# Patient Record
Sex: FEMALE | Race: WHITE | NOT HISPANIC OR LATINO | Employment: UNEMPLOYED | URBAN - METROPOLITAN AREA
[De-identification: names, ages, dates, MRNs, and addresses within clinical notes are randomized per-mention and may not be internally consistent; named-entity substitution may affect disease eponyms.]

---

## 2024-03-22 ENCOUNTER — OFFICE VISIT (OUTPATIENT)
Dept: URGENT CARE | Facility: CLINIC | Age: 39
End: 2024-03-22
Payer: MEDICAID

## 2024-03-22 VITALS
BODY MASS INDEX: 26.19 KG/M2 | OXYGEN SATURATION: 96 % | HEIGHT: 67 IN | DIASTOLIC BLOOD PRESSURE: 62 MMHG | WEIGHT: 166.9 LBS | HEART RATE: 81 BPM | SYSTOLIC BLOOD PRESSURE: 96 MMHG | TEMPERATURE: 97.3 F | RESPIRATION RATE: 16 BRPM

## 2024-03-22 DIAGNOSIS — R11.2 NAUSEA AND VOMITING, UNSPECIFIED VOMITING TYPE: ICD-10-CM

## 2024-03-22 DIAGNOSIS — E86.0 DEHYDRATION: ICD-10-CM

## 2024-03-22 LAB
APPEARANCE UR: NORMAL
BILIRUB UR STRIP-MCNC: NORMAL MG/DL
COLOR UR AUTO: NORMAL
GLUCOSE UR STRIP.AUTO-MCNC: NEGATIVE MG/DL
KETONES UR STRIP.AUTO-MCNC: 15 MG/DL
LEUKOCYTE ESTERASE UR QL STRIP.AUTO: NEGATIVE
NITRITE UR QL STRIP.AUTO: NEGATIVE
PH UR STRIP.AUTO: 6 [PH] (ref 5–8)
POCT INT CON NEG: NEGATIVE
POCT INT CON POS: POSITIVE
POCT URINE PREGNANCY TEST: NEGATIVE
PROT UR QL STRIP: NEGATIVE MG/DL
RBC UR QL AUTO: NEGATIVE
SP GR UR STRIP.AUTO: >=1.03
UROBILINOGEN UR STRIP-MCNC: 1 MG/DL

## 2024-03-22 PROCEDURE — 3078F DIAST BP <80 MM HG: CPT

## 2024-03-22 PROCEDURE — 81002 URINALYSIS NONAUTO W/O SCOPE: CPT

## 2024-03-22 PROCEDURE — 81025 URINE PREGNANCY TEST: CPT

## 2024-03-22 PROCEDURE — 3074F SYST BP LT 130 MM HG: CPT

## 2024-03-22 PROCEDURE — 99204 OFFICE O/P NEW MOD 45 MIN: CPT | Mod: 25

## 2024-03-22 RX ORDER — PROMETHAZINE HYDROCHLORIDE 25 MG/1
25 TABLET ORAL EVERY 6 HOURS PRN
Qty: 5 TABLET | Refills: 0 | Status: SHIPPED | OUTPATIENT
Start: 2024-03-22

## 2024-03-22 RX ORDER — LAMOTRIGINE 25 MG/1
25 TABLET ORAL DAILY
COMMUNITY
Start: 2024-01-31

## 2024-03-22 RX ORDER — BUPRENORPHINE HYDROCHLORIDE AND NALOXONE HYDROCHLORIDE DIHYDRATE 8; 2 MG/1; MG/1
1 TABLET SUBLINGUAL DAILY
COMMUNITY

## 2024-03-22 RX ORDER — LURASIDONE HYDROCHLORIDE 40 MG/1
40 TABLET, FILM COATED ORAL
COMMUNITY
Start: 2024-01-30

## 2024-03-22 RX ORDER — GABAPENTIN 100 MG/1
100 CAPSULE ORAL
COMMUNITY
Start: 2024-01-30

## 2024-03-22 RX ORDER — TRAZODONE HYDROCHLORIDE 50 MG/1
50 TABLET ORAL
COMMUNITY
Start: 2024-01-30

## 2024-03-22 RX ORDER — ONDANSETRON 4 MG/1
4 TABLET, ORALLY DISINTEGRATING ORAL ONCE
Status: COMPLETED | OUTPATIENT
Start: 2024-03-22 | End: 2024-03-22

## 2024-03-22 RX ADMIN — ONDANSETRON 4 MG: 4 TABLET, ORALLY DISINTEGRATING ORAL at 12:37

## 2024-03-22 NOTE — PROGRESS NOTES
"Chief Complaint   Patient presents with    Emesis     X 2 days vomiting/ [facility] wants her to come here it started after starting the new meds          Subjective:   HISTORY OF PRESENT ILLNESS: Lina Mojica is a 38 y.o. female who presents for nausea and vomiting x 2 days after starting subutex [facility].  She had a similar reaction to naltrexone in the beginning of the year.  Her last use of meth she reports was in mid February, no narcotics or drug use since.     Patient denies abd pain, diarrhea, fevers, runny nose or cough. She  tried zofran the last time this occurred and reports it was unhelpful for her nausea     Medications, Allergies, current problem list, Social and Family history reviewed today in Epic.     Objective:     BP 96/62 (BP Location: Left arm, Patient Position: Sitting, BP Cuff Size: Adult)   Pulse 81   Temp 36.3 °C (97.3 °F) (Temporal)   Resp 16   Ht 1.702 m (5' 7\")   Wt 75.7 kg (166 lb 14.4 oz)   SpO2 96%     Physical Exam  Vitals reviewed.   Constitutional:       General: She is not in acute distress.     Appearance: Normal appearance. She is not ill-appearing.   HENT:      Head: Normocephalic.      Nose: No congestion or rhinorrhea.      Mouth/Throat:      Mouth: Mucous membranes are moist.   Eyes:      Pupils: Pupils are equal, round, and reactive to light.   Cardiovascular:      Rate and Rhythm: Normal rate.      Pulses: Normal pulses.   Pulmonary:      Effort: Pulmonary effort is normal.   Abdominal:      General: Abdomen is flat. Bowel sounds are normal.      Palpations: Abdomen is soft.   Musculoskeletal:      Cervical back: Normal range of motion.   Skin:     General: Skin is warm and dry.      Capillary Refill: Capillary refill takes less than 2 seconds.   Neurological:      General: No focal deficit present.      Mental Status: She is alert and oriented to person, place, and time.   Psychiatric:         Mood and Affect: Mood normal.      "     Assessment/Plan:     Diagnosis and associated orders    I personally reviewed prior external notes and test results pertinent to today's visit.     1. Nausea and vomiting, unspecified vomiting type  POCT Urinalysis    POCT Pregnancy    promethazine (PHENERGAN) 25 MG Tab    ondansetron (Zofran ODT) dispertab 4 mg      2. Dehydration          Results for orders placed or performed in visit on 03/22/24   POCT Urinalysis   Result Value Ref Range    POC Color DARK YELLOW Negative    POC Appearance CLOUDY Negative    POC Glucose NEGATIVE Negative mg/dL    POC Bilirubin SMALL Negative mg/dL    POC Ketones 15 Negative mg/dL    POC Specific Gravity >=1.030 <1.005 - >1.030    POC Blood NEGATIVE Negative    POC Urine PH 6.0 5.0 - 8.0    POC Protein NEGATIVE Negative mg/dL    POC Urobiligen 1.0 Negative (0.2) mg/dL    POC Nitrites NEGATIVE Negative    POC Leukocyte Esterase NEGATIVE Negative   POCT Pregnancy   Result Value Ref Range    POC Urine Pregnancy Test Negative     Internal Control Positive Positive     Internal Control Negative Negative         IMPRESSION:  Pt has stable vital signs and no red flag symptoms or exam findings identified.  Her bad is soft and non-tender, she has dry mucus membranes, her BP is on the lower side but she has no tachycardia or dizziness when she stands, she is making urine, no clinic signs of severe dehydration.  I think she can be managed as an outpt, although I advised her if the phenergan is not working she will need to go to the ER for IV fluids.  She may not be able to get the phen[facility]gan until [facility] so she is given a dose of zofran in the clinic  Advised to push oral fluids with electrolytes    Differential diagnosis discussed. Pt was Educated on red flag symptoms. Pt has been Instructed to return to Urgent Care or nearest Emergency Department if symptoms fail to improve, for any change in condition, further concerns, or new concerning symptoms. Patient states  understanding of the plan of care and discharge instructions.  They are discharged in stable condition.         Please note that this dictation was created using voice recognition software. I have made a reasonable attempt to correct obvious errors, but I expect that there are errors of grammar and possibly content that I did not discover before finalizing the note.    This note was electronically signed by SREEKANTH Colvin

## 2024-04-02 ENCOUNTER — HOSPITAL ENCOUNTER (EMERGENCY)
Facility: MEDICAL CENTER | Age: 39
End: 2024-04-03
Attending: EMERGENCY MEDICINE
Payer: MEDICAID

## 2024-04-02 DIAGNOSIS — R45.851 SUICIDAL IDEATION: ICD-10-CM

## 2024-04-02 DIAGNOSIS — R44.3 HALLUCINATIONS: ICD-10-CM

## 2024-04-02 LAB
AMPHET UR QL SCN: NEGATIVE
BARBITURATES UR QL SCN: NEGATIVE
BENZODIAZ UR QL SCN: NEGATIVE
BZE UR QL SCN: NEGATIVE
CANNABINOIDS UR QL SCN: NEGATIVE
FENTANYL UR QL: NEGATIVE
FLUAV RNA SPEC QL NAA+PROBE: NEGATIVE
FLUBV RNA SPEC QL NAA+PROBE: NEGATIVE
HCG UR QL: NEGATIVE
METHADONE UR QL SCN: NEGATIVE
OPIATES UR QL SCN: NEGATIVE
OXYCODONE UR QL SCN: NEGATIVE
PCP UR QL SCN: NEGATIVE
POC BREATHALIZER: 0 PERCENT (ref 0–0.01)
PROPOXYPH UR QL SCN: NEGATIVE
RSV RNA SPEC QL NAA+PROBE: NEGATIVE
SARS-COV-2 RNA RESP QL NAA+PROBE: NOTDETECTED

## 2024-04-02 PROCEDURE — 0241U HCHG SARS-COV-2 COVID-19 NFCT DS RESP RNA 4 TRGT ED POC: CPT

## 2024-04-02 PROCEDURE — 99285 EMERGENCY DEPT VISIT HI MDM: CPT

## 2024-04-02 PROCEDURE — A9270 NON-COVERED ITEM OR SERVICE: HCPCS | Mod: UD | Performed by: EMERGENCY MEDICINE

## 2024-04-02 PROCEDURE — 80307 DRUG TEST PRSMV CHEM ANLYZR: CPT

## 2024-04-02 PROCEDURE — 700102 HCHG RX REV CODE 250 W/ 637 OVERRIDE(OP): Mod: UD | Performed by: EMERGENCY MEDICINE

## 2024-04-02 PROCEDURE — 90791 PSYCH DIAGNOSTIC EVALUATION: CPT

## 2024-04-02 PROCEDURE — 81025 URINE PREGNANCY TEST: CPT

## 2024-04-02 PROCEDURE — 302970 POC BREATHALIZER: Performed by: EMERGENCY MEDICINE

## 2024-04-02 RX ORDER — HALOPERIDOL 5 MG/1
5 TABLET ORAL ONCE
Status: COMPLETED | OUTPATIENT
Start: 2024-04-02 | End: 2024-04-02

## 2024-04-02 RX ADMIN — HALOPERIDOL 5 MG: 5 TABLET ORAL at 17:45

## 2024-04-03 VITALS
TEMPERATURE: 98.4 F | HEIGHT: 67 IN | RESPIRATION RATE: 17 BRPM | BODY MASS INDEX: 26.68 KG/M2 | DIASTOLIC BLOOD PRESSURE: 89 MMHG | SYSTOLIC BLOOD PRESSURE: 120 MMHG | OXYGEN SATURATION: 98 % | HEART RATE: 74 BPM | WEIGHT: 170 LBS

## 2024-04-03 NOTE — ED PROVIDER NOTES
"Patient:Lina Mojica  Patient : 1985  Patient MRN: [id number]  Patient PCP: Pcp Pt States None    Admit Date: 2024  Discharge Date and Time: 24 0100 AM  Discharge Diagnosis: Suicidal ideation  Discharge Attending: Connor Kwan D.O.  Discharge Service: ED Observation    ED Course  Lina is a 38 y.o. female who was signed out from Dr. Collado for continued monitoring until appropriate disposition can be arranged.  Please see his note for the initial evaluation and management.  Patient was accepted at [facility] unit and transferred for continued care.  No significant events while in the ED.    Discharge Exam:  /89   Pulse 74   Temp 36.9 °C (98.4 °F)   Resp 17   Ht 1.702 m (5' 7\")   Wt 77.1 kg (170 lb)   SpO2 98%   BMI 26.63 kg/m² .    Constitutional: Awake and alert. Nontoxic  HENT:  Grossly normal  Eyes: Grossly normal  Cardiovascular: Normal heart rate   Thorax & Lungs: No respiratory distress  Skin:  No pathologic rash.   Extremities: Well perfused    Labs  Results for orders placed or performed during the hospital encounter of 24   Urine Drug Screen   Result Value Ref Range    Amphetamines Urine Negative Negative    Barbiturates Negative Negative    Benzodiazepines Negative Negative    Cocaine Metabolite Negative Negative    Fentanyl, Urine Negative Negative    Methadone Negative Negative    Opiates Negative Negative    Oxycodone Negative Negative    Phencyclidine -Pcp Negative Negative    Propoxyphene Negative Negative    Cannabinoid Metab Negative Negative   BETA-HCG QUALITATIVE URINE   Result Value Ref Range    Beta-Hcg Urine Negative Negative   POC BREATHALIZER   Result Value Ref Range    POC Breathalizer 0.0 0.00 - 0.01 Percent   POC CoV-2, FLU A/B, RSV by PCR   Result Value Ref Range    POC Influenza A RNA, PCR Negative Negative    POC Influenza B RNA, PCR Negative Negative    POC RSV, by PCR Negative Negative    POC SARS-CoV-2, PCR NotDetected  "       Radiology  No orders to display       Disposition: Transfer to [facility] for inpatient psychiatric care    Follow up: No follow-ups on file.    Medications:   Discharge Medication List as of 4/3/2024  1:13 AM          Discharge Condition: Stable    Electronically signed by: Connor Kwan D.O., 4/3/2024 2:29 AM

## 2024-04-03 NOTE — DISCHARGE PLANNING
Alert Team:     Referral: Adult Patient Transfer to Mental Health Facility     Intervention: Received call from Paulina at [facility] stating that Dr. Moreau has accepted the patient for admission.  Facility requests that transport be arranged for 0100.     Arranged for transportation to be set up through [facility] with [facility] for [facility] transport.     The pt will be picked up at 0100.      Notified the RN of the departure time as well as accepting facility.      Created transfer packet and placed on chart.      Plan: Pt will transfer to [facility] at 0100.
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
## 2024-04-03 NOTE — ED TRIAGE NOTES
Chief Complaint   Patient presents with    Suicidal Ideation    Hallucinations       Pt BIB EMS for above complaint. Pt reports hx of schizoaffective and manic bipolar, currently medication complaint. Pt states yesterday she started having auditory hallucinations that have increased today. Pt reports hallucinations are of negative male figures from her life and are telling her to kill herself. Pt reports plan to cut her wrists and states she does have access to knives. Pt denies HI.     Legal hold initiated. 1:1 sitter at bedside. Pt changed into paper scrubs and all belongings removed from room. Urine sample collected and sent to lab. POC breathalyzer 0.0

## 2024-04-03 NOTE — ED PROVIDER NOTES
"ER Provider Note    Scribed for Darrick Collado D.O. by Missael Stanton. 4/2/2024  5:19 PM    Primary Care Provider: Pcp Pt States None    CHIEF COMPLAINT  Chief Complaint   Patient presents with    Suicidal Ideation    Hallucinations       HPI/ROS    Lina Mojica is a 38 y.o. female with a history of manic depression who presents to the Emergency Department via EMS for evaluation of suicidal ideation and hallucinations onset earlier today. The patient reports she is currently having a manic episode for the first time in a while. Yesterday she began to feel \"off\" and today she began experiencing auditory hallucinations that have been getting louder throughout the day. She reports these hallucinations are voices that are telling her to harm herself. She reports she has not tried to harm herself today but is unsure of if she would if she was not here in the ED. She notes she takes Latuda. The patient is currently trying to recover from drug and alcohol abuse and notes her last use of methamphetamine or heroine was 1 month ago and her last drink was in 2022.     ROS as per HPI.    PAST MEDICAL HISTORY  None noted.    SURGICAL HISTORY  None noted.     FAMILY HISTORY  None noted.     SOCIAL HISTORY   reports that she has quit smoking. Her smoking use included cigarettes. She has quit using smokeless tobacco. She reports that she does not currently use alcohol. She reports that she does not currently use drugs after having used the following drugs: Methamphetamines.    CURRENT MEDICATIONS  Previous Medications    BUPRENORPHINE-NALOXONE (SUBOXONE) 8-2 MG SL TAB SL    Place 1 Tablet under the tongue every day.    GABAPENTIN (NEURONTIN) 100 MG CAP    Take 100 mg by mouth.    LAMOTRIGINE (LAMICTAL) 25 MG TAB    Take 25 mg by mouth every day.    LURASIDONE (LATUDA) 40 MG TAB    Take 40 mg by mouth.    PROMETHAZINE (PHENERGAN) 25 MG TAB    Take 1 Tablet by mouth every 6 hours as needed for Nausea/Vomiting for up to 5 " "doses.    TRAZODONE (DESYREL) 50 MG TAB    Take 50 mg by mouth.       ALLERGIES  Patient has no known allergies.    PHYSICAL EXAM  /71   Pulse 85   Temp 36.2 °C (97.1 °F) (Temporal)   Resp 16   Ht 1.702 m (5' 7\")   Wt 77.1 kg (170 lb)   SpO2 96%   BMI 26.63 kg/m²     General: No acute distress.  HENT: Normocephalic, Mucus membranes are moist.   Chest: Lungs have even and unlabored respirations, Clear to auscultation.   Cardiovascular: Regular rate and regular rhythm, No peripheral cyanosis.  Abdomen: Non distended.  Neuro: Awake, Conversive, Able to relay recent events.  Psychiatric: Mildly anxious, admits to hearing voices telling her to harm herself and states she does not know if she will be safe at home    EXTERNAL RECORDS REVIEWED  Review of patient's past medical records from [place] show no encounters for psychosis or depression. She was seen in office on 1/21/22 with a diagnosis of methamphetamine use.     INITIAL ASSESSMENT  The patient has a history of manic depression and feels this is an escalation of that. She reports voices telling her to harm herself. She denies any drug or alcohol use. Patient will be treated with haldol and evaluation with psychology social work.    ED Observation Status? Yes; I am placing the patient in to an observation status due to a diagnostic uncertainty as well as therapeutic intensity. Patient placed in observation status at 5:19 PM, 4/2/2024.     Observation plan is as follows: Will hold in ED until safe disposition.      DIAGNOSTIC STUDIES    Labs:   Results for orders placed or performed during the hospital encounter of 04/02/24   Urine Drug Screen   Result Value Ref Range    Amphetamines Urine Negative Negative    Barbiturates Negative Negative    Benzodiazepines Negative Negative    Cocaine Metabolite Negative Negative    Fentanyl, Urine Negative Negative    Methadone Negative Negative    Opiates Negative Negative    Oxycodone Negative Negative    " Phencyclidine -Pcp Negative Negative    Propoxyphene Negative Negative    Cannabinoid Metab Negative Negative   POC BREATHALIZER   Result Value Ref Range    POC Breathalizer 0.0 0.00 - 0.01 Percent       COURSE & MEDICAL DECISION MAKING     COURSE AND PLAN  5:19 PM - Patient seen and examined at bedside. Discussed plan of care, including treating the patient's symptoms and discussed with social work. Patient agrees to the plan of care. The patient will be medicated with Haldol 5 mg PO. Ordered for urine drug screen and POC breathalizer to evaluate her symptoms.     5:51 PM - The patient was seen by the alert team, and they recommend a legal hold. Legal hold was initiated at this time. I will maintain observation.     ED Summary: This patient has a history of bipolar and she is feeling manic, she is hearing voices that are telling her to harm herself.  She has not acted out on these voices but does not know if she will be safe for discharge.  She was evaluate by the alert team and legal 2000 is initiated.  The patient is on a legal hold, and will be held in ED observation until appropriate psychiatric disposition can be made.      ADDITIONAL PROBLEM LIST      DISPOSITION AND DISCUSSIONS    Discussion of management with other QHP or appropriate source(s): Behavioral health evaluated the patient and recommended a legal hold.     Barriers to care at this time, including but not limited to: The patient does not have an established PCP.     ED observation    FINAL DIAGNOSIS  1. Hallucinations    2. Suicidal ideation        Missael MISTRY (Erendira), am scribing for, and in the presence of, Darrick Collado D.O..    Electronically signed by: Missael Stanton (Erendira), 4/2/2024    Darrick MISTRY D.O. personally performed the services described in this documentation, as scribed by Missael Stanton in my presence, and it is both accurate and complete.     The note accurately reflects work and decisions made by me.  Darrick  GILMA Collado  4/2/2024  7:13 PM

## 2024-04-03 NOTE — DISCHARGE PLANNING
[facility] ED Behavioral Health Fax Referral      [facility] ED Behavioral Health Alert Team:  [phone]    Referral: Legal Hold    Intervention: Patient referral to [facility] inpatient  facilty    Legal Hold Initiated: Date: 4/2/24 Time: 1712    Patient’s Insurance Listed on Face Sheet: Medicaid FFS    Referrals sent to: [facility]    Referrals faxed by Leny PÉREZ    This referral contains the following information:  Face sheet __x__  Page 1 and Page 2 of Legal Hold _x___  Alert Team Assessment/Psych Assessment __x__  Head to toe physical exam __x__  Urine Drug Screen __x__  Blood Alcohol __x__  Vital signs _x___  Pregnancy test when applicable __x_  Medications list __x__  Covid screening ____    Plan: Patient will transfer to mental health facility once acceptance is obtained

## 2024-04-12 ENCOUNTER — APPOINTMENT (OUTPATIENT)
Dept: RADIOLOGY | Facility: MEDICAL CENTER | Age: 39
End: 2024-04-12
Attending: EMERGENCY MEDICINE
Payer: MEDICAID

## 2024-04-12 ENCOUNTER — HOSPITAL ENCOUNTER (EMERGENCY)
Facility: MEDICAL CENTER | Age: 39
End: 2024-04-12
Attending: EMERGENCY MEDICINE
Payer: MEDICAID

## 2024-04-12 VITALS
HEART RATE: 74 BPM | SYSTOLIC BLOOD PRESSURE: 97 MMHG | RESPIRATION RATE: 13 BRPM | OXYGEN SATURATION: 90 % | TEMPERATURE: 98.5 F | DIASTOLIC BLOOD PRESSURE: 55 MMHG | WEIGHT: 173.94 LBS | HEIGHT: 67 IN | BODY MASS INDEX: 27.3 KG/M2

## 2024-04-12 DIAGNOSIS — I95.89 IATROGENIC HYPOTENSION: ICD-10-CM

## 2024-04-12 LAB
ALBUMIN SERPL BCP-MCNC: 3.9 G/DL (ref 3.2–4.9)
ALBUMIN/GLOB SERPL: 1.4 G/DL
ALP SERPL-CCNC: 62 U/L (ref 30–99)
ALT SERPL-CCNC: 21 U/L (ref 2–50)
ANION GAP SERPL CALC-SCNC: 7 MMOL/L (ref 7–16)
APPEARANCE UR: CLEAR
AST SERPL-CCNC: 26 U/L (ref 12–45)
BACTERIA #/AREA URNS HPF: NEGATIVE /HPF
BASOPHILS # BLD AUTO: 1.2 % (ref 0–1.8)
BASOPHILS # BLD: 0.05 K/UL (ref 0–0.12)
BILIRUB SERPL-MCNC: 0.2 MG/DL (ref 0.1–1.5)
BILIRUB UR QL STRIP.AUTO: NEGATIVE
BUN SERPL-MCNC: 14 MG/DL (ref 8–22)
CALCIUM ALBUM COR SERPL-MCNC: 9.1 MG/DL (ref 8.5–10.5)
CALCIUM SERPL-MCNC: 9 MG/DL (ref 8.5–10.5)
CHLORIDE SERPL-SCNC: 107 MMOL/L (ref 96–112)
CO2 SERPL-SCNC: 25 MMOL/L (ref 20–33)
COLOR UR: YELLOW
CREAT SERPL-MCNC: 0.79 MG/DL (ref 0.5–1.4)
EKG IMPRESSION: NORMAL
EOSINOPHIL # BLD AUTO: 0.38 K/UL (ref 0–0.51)
EOSINOPHIL NFR BLD: 9.2 % (ref 0–6.9)
EPI CELLS #/AREA URNS HPF: NORMAL /HPF
ERYTHROCYTE [DISTWIDTH] IN BLOOD BY AUTOMATED COUNT: 41.3 FL (ref 35.9–50)
GFR SERPLBLD CREATININE-BSD FMLA CKD-EPI: 99 ML/MIN/1.73 M 2
GLOBULIN SER CALC-MCNC: 2.7 G/DL (ref 1.9–3.5)
GLUCOSE SERPL-MCNC: 82 MG/DL (ref 65–99)
GLUCOSE UR STRIP.AUTO-MCNC: NEGATIVE MG/DL
HCG SERPL QL: NEGATIVE
HCT VFR BLD AUTO: 39.7 % (ref 37–47)
HGB BLD-MCNC: 13.3 G/DL (ref 12–16)
HYALINE CASTS #/AREA URNS LPF: NORMAL /LPF
IMM GRANULOCYTES # BLD AUTO: 0.01 K/UL (ref 0–0.11)
IMM GRANULOCYTES NFR BLD AUTO: 0.2 % (ref 0–0.9)
KETONES UR STRIP.AUTO-MCNC: NEGATIVE MG/DL
LACTATE SERPL-SCNC: 0.8 MMOL/L (ref 0.5–2)
LEUKOCYTE ESTERASE UR QL STRIP.AUTO: ABNORMAL
LYMPHOCYTES # BLD AUTO: 1.53 K/UL (ref 1–4.8)
LYMPHOCYTES NFR BLD: 37 % (ref 22–41)
MCH RBC QN AUTO: 27 PG (ref 27–33)
MCHC RBC AUTO-ENTMCNC: 33.5 G/DL (ref 32.2–35.5)
MCV RBC AUTO: 80.5 FL (ref 81.4–97.8)
MICRO URNS: ABNORMAL
MONOCYTES # BLD AUTO: 0.41 K/UL (ref 0–0.85)
MONOCYTES NFR BLD AUTO: 9.9 % (ref 0–13.4)
NEUTROPHILS # BLD AUTO: 1.76 K/UL (ref 1.82–7.42)
NEUTROPHILS NFR BLD: 42.5 % (ref 44–72)
NITRITE UR QL STRIP.AUTO: NEGATIVE
NRBC # BLD AUTO: 0 K/UL
NRBC BLD-RTO: 0 /100 WBC (ref 0–0.2)
NT-PROBNP SERPL IA-MCNC: 58 PG/ML (ref 0–125)
PH UR STRIP.AUTO: 5.5 [PH] (ref 5–8)
PLATELET # BLD AUTO: 236 K/UL (ref 164–446)
PMV BLD AUTO: 9.6 FL (ref 9–12.9)
POTASSIUM SERPL-SCNC: 4.4 MMOL/L (ref 3.6–5.5)
PROT SERPL-MCNC: 6.6 G/DL (ref 6–8.2)
PROT UR QL STRIP: NEGATIVE MG/DL
RBC # BLD AUTO: 4.93 M/UL (ref 4.2–5.4)
RBC # URNS HPF: NORMAL /HPF
RBC UR QL AUTO: NEGATIVE
SODIUM SERPL-SCNC: 139 MMOL/L (ref 135–145)
SP GR UR STRIP.AUTO: 1.02
TROPONIN T SERPL-MCNC: 13 NG/L (ref 6–19)
TSH SERPL DL<=0.005 MIU/L-ACNC: 2.32 UIU/ML (ref 0.38–5.33)
UROBILINOGEN UR STRIP.AUTO-MCNC: 0.2 MG/DL
WBC # BLD AUTO: 4.1 K/UL (ref 4.8–10.8)
WBC #/AREA URNS HPF: NORMAL /HPF

## 2024-04-12 PROCEDURE — 84484 ASSAY OF TROPONIN QUANT: CPT

## 2024-04-12 PROCEDURE — 99284 EMERGENCY DEPT VISIT MOD MDM: CPT

## 2024-04-12 PROCEDURE — 83880 ASSAY OF NATRIURETIC PEPTIDE: CPT

## 2024-04-12 PROCEDURE — 84443 ASSAY THYROID STIM HORMONE: CPT

## 2024-04-12 PROCEDURE — 71045 X-RAY EXAM CHEST 1 VIEW: CPT

## 2024-04-12 PROCEDURE — 85025 COMPLETE CBC W/AUTO DIFF WBC: CPT

## 2024-04-12 PROCEDURE — 93005 ELECTROCARDIOGRAM TRACING: CPT | Performed by: EMERGENCY MEDICINE

## 2024-04-12 PROCEDURE — 80053 COMPREHEN METABOLIC PANEL: CPT

## 2024-04-12 PROCEDURE — 36415 COLL VENOUS BLD VENIPUNCTURE: CPT

## 2024-04-12 PROCEDURE — 700105 HCHG RX REV CODE 258: Mod: UD | Performed by: EMERGENCY MEDICINE

## 2024-04-12 PROCEDURE — 84703 CHORIONIC GONADOTROPIN ASSAY: CPT

## 2024-04-12 PROCEDURE — 81001 URINALYSIS AUTO W/SCOPE: CPT

## 2024-04-12 PROCEDURE — 83605 ASSAY OF LACTIC ACID: CPT

## 2024-04-12 PROCEDURE — 86480 TB TEST CELL IMMUN MEASURE: CPT

## 2024-04-12 PROCEDURE — 71250 CT THORAX DX C-: CPT

## 2024-04-12 RX ORDER — SODIUM CHLORIDE 9 MG/ML
1000 INJECTION, SOLUTION INTRAVENOUS ONCE
Status: COMPLETED | OUTPATIENT
Start: 2024-04-12 | End: 2024-04-12

## 2024-04-12 RX ADMIN — SODIUM CHLORIDE 1000 ML: 9 INJECTION, SOLUTION INTRAVENOUS at 14:32

## 2024-04-12 RX ADMIN — SODIUM CHLORIDE 1000 ML: 9 INJECTION, SOLUTION INTRAVENOUS at 13:17

## 2024-04-12 ASSESSMENT — FIBROSIS 4 INDEX: FIB4 SCORE: 1.04

## 2024-04-12 NOTE — ED NOTES
Pt ambulated with steady gait to the restroom and back to her room, steady gait, oxygen reported at 92% on room air after ambulating

## 2024-04-12 NOTE — ED PROVIDER NOTES
ED Provider Note    Primary care provider: Pcp Pt States None    CHIEF COMPLAINT  Chief Complaint   Patient presents with    Hypotension     Pt states chronic hypotension and falling asleep frequently for 2-3 weeks     HPI  Lina Mojica is a 38 y.o. female who presents to the Emergency Department for hypotension.  The patient is at [facility], early into recovery, she does not use any illicit drugs since February.  She notes that for the past several months she has had some difficulty staying awake, frequently falls asleep in lectures.  She had some medications added on her recent inpatient stay to include prazosin.  She took all of her medications as directed in the past few days.  She also has a history of hypothyroidism, has not been on any thyroid medication since she was incarcerated a few years ago.    Denies any fevers or chills, denies any pain.  Just notes feeling tired, low blood pressure.      External Record Review: Patient was discharged from [facility] inpatient psychiatric unit on 4/8.  I reviewed the discharge diagnosis.  She was discharged with a history of schizoaffective disorder, methamphetamine dependence, opioid dependence in recovery, PTSD.  She was prescribed azithromycin, cefdinir, Atarax, trazodone, buprenorphine, bupropion XL, gabapentin, lurasidone, mirtazapine, prazosin.    REVIEW OF SYSTEMS  See HPI.     PAST MEDICAL HISTORY       SURGICAL HISTORY  patient denies any surgical history    SOCIAL HISTORY  Social History     Tobacco Use    Smoking status: Former     Types: Cigarettes    Smokeless tobacco: Former   Vaping Use    Vaping Use: Former   Substance Use Topics    Alcohol use: Not Currently    Drug use: Not Currently     Types: Methamphetamines     Comment: heroine      Social History     Substance and Sexual Activity   Drug Use Not Currently    Types: Methamphetamines    Comment: heroine       FAMILY HISTORY  No family history on file.    CURRENT MEDICATIONS  Reviewed.  See  "Encounter Summary.     ALLERGIES  No Known Allergies    PHYSICAL EXAM  VITAL SIGNS: /75   Pulse 72   Temp 36.9 °C (98.5 °F) (Temporal)   Resp 17   Ht 1.702 m (5' 7\")   Wt 78.9 kg (173 lb 15.1 oz)   SpO2 92%   BMI 27.24 kg/m²   Constitutional: Awake, alert in no apparent distress.  HENT: Normocephalic, Bilateral external ears normal. Nose normal.   Eyes: Conjunctiva normal, non-icteric, EOMI.    Thorax & Lungs: Easy unlabored respirations, Clear to ascultation bilaterally.  Cardiovascular: Regular rate, Regular rhythm, No murmurs, rubs or gallops. Bilateral pulses symmetrical.   Abdomen:  Soft, nontender, nondistended, normal active bowel sounds.   :    Skin: Visualized skin is  Dry, No erythema, No rash.   Musculoskeletal:   No cyanosis, clubbing or edema. No leg asymmetry.   Neurologic: Alert, Grossly non-focal.   Psychiatric: Normal affect, Normal mood  Lymphatic:      EKG   12 lead Interpreted by me  Rhythm:  Normal sinus rhythm   Rate: 64  Axis: normal  Ectopy: none  Conduction: normal  ST Segments: no acute change  T Waves: no acute change  Clinical Impression: Normal EKG without acute changes       RADIOLOGY  I have independently interpreted the diagnostic imaging associated with this visit and am waiting the final reading from the radiologist.   My preliminary interpretation is as follows: Reviewed the chest x-ray, agree that there does appear to be a possible cavitary lesion retrocardiac.    Radiologist interpretation:   CT-CHEST (THORAX) W/O   Final Result      1.  Large hiatal hernia   2.  Patchy lingular opacity suspicious for early pneumonia   3.  Trace BILATERAL pleural effusions   4.  Trace pericardial effusion   5.  Calcification in the region of the liver hilum could be related to the presence of a gallstone or other mass. Suggest further assessment with MRCP when clinically appropriate         DX-CHEST-PORTABLE (1 VIEW)   Final Result      1.  Suspect left lower lobe infiltrate, " possible cavitary lesion. Less likely but possible hiatus hernia.          COURSE & MEDICAL DECISION MAKING  Pertinent Labs & Imaging studies reviewed. (See chart for details)    COURSE & MEDICAL DECISION MAKING  Pertinent Labs & Imaging studies reviewed. (See chart for details)    Differential diagnoses include but are not limited to: Likely iatrogenic hypotension, other consideration be sepsis, symptomatic anemia, hypothyroidism    1:11 PM - Nursing notes reviewed, patient seen and examined at bedside.    2:16 PM: With the questionable cavitary lesion noted on chest x-ray, noncontrast CT ordered, QuantiFERON ordered as well.  I reviewed the discharge narrative, the patient had a left lower lobe infiltrate on chest x-ray at [facility], did not receive a CT.  Had a negative CTA of the chest at our facility in February.    4:22 PM: CT without any evidence of malignancy or cavitary lesion.    Escalation of care considered, and ultimately not performed: acute inpatient care management, however at this time, the patient is most appropriate for outpatient management.    Barriers to care at this time, including but not limited to: Patient does not have established PCP.     Decision tools and prescription drugs considered including, but not limited to: Heart score 1    Decision Making:  This is a pleasant 38 y.o. year old female who presents with hypotension.  The patient is on numerous medications, see above.  Looking at the medications, the most recent addition was prazosin which certainly causes hypotension.  I would like for her to discontinue this, if she still has low blood pressure, the next medication to discontinue would be the mirtazapine.  She received 2 L of IV fluids with resolution of her hypotension.  She is not septic, she is not anemic.  She does not have renal insufficiency.  There was a possibility of a cavitary lesion noted on chest x-ray, for this reason I did a CT chest that does not show any  cavitary lesion but does have a large hiatal hernia.  There is possibility of pneumonia noted on this.  She does not appear infectious at this time, is completing a course antibiotics for pneumonia from [facility].  No indication to redose at this time.    As she is improved, tolerating p.o., blood pressure normalized, I think she is stable to go back to [place] rehab.     The patient was discharged home (see d/c instructions) was told to return immediately for any signs or symptoms listed, or any worsening at all.  The patient verbally agreed to the discharge precautions and follow-up plan which is documented in EPIC.    Discharge Medications:  New Prescriptions    No medications on file       FINAL IMPRESSION  1. Iatrogenic hypotension

## 2024-04-12 NOTE — ED NOTES
While laying in bed, pts oxygen level dropped down to 87% on room air, pt placed on 2 liters oxygen via nasal canula

## 2024-04-12 NOTE — ED NOTES
Pt resting in bed, eyes closed, equal chest rise noted, no signs of distress noted at this time, will continue to monitor

## 2024-04-12 NOTE — DISCHARGE INSTRUCTIONS
discontinue the prazosin, as that medication is the most likely to be causing his low blood pressure and somnolence.  If you still continue to have somnolence, the next medication I would discontinue would be mirtazapine.  Please give it a few days before you start discontinuing additional medications.

## 2024-04-12 NOTE — ED TRIAGE NOTES
Chief Complaint   Patient presents with    Hypotension     Pt states chronic hypotension and falling asleep frequently for 2-3 weeks

## 2024-04-15 LAB
GAMMA INTERFERON BACKGROUND BLD IA-ACNC: 0.04 IU/ML
M TB IFN-G BLD-IMP: NEGATIVE
M TB IFN-G CD4+ BCKGRND COR BLD-ACNC: 0.01 IU/ML
MITOGEN IGNF BCKGRD COR BLD-ACNC: >10 IU/ML
QFT TB2 - NIL TBQ2: 0.01 IU/ML

## 2024-04-26 ENCOUNTER — APPOINTMENT (OUTPATIENT)
Dept: RADIOLOGY | Facility: MEDICAL CENTER | Age: 39
End: 2024-04-26
Attending: NURSE PRACTITIONER
Payer: MEDICAID

## 2024-04-26 ENCOUNTER — OFFICE VISIT (OUTPATIENT)
Dept: URGENT CARE | Facility: CLINIC | Age: 39
End: 2024-04-26
Payer: MEDICAID

## 2024-04-26 VITALS
DIASTOLIC BLOOD PRESSURE: 76 MMHG | HEIGHT: 67 IN | HEART RATE: 91 BPM | TEMPERATURE: 99.2 F | BODY MASS INDEX: 28.24 KG/M2 | RESPIRATION RATE: 16 BRPM | WEIGHT: 179.9 LBS | SYSTOLIC BLOOD PRESSURE: 104 MMHG | OXYGEN SATURATION: 93 %

## 2024-04-26 DIAGNOSIS — M79.89 LEG SWELLING: ICD-10-CM

## 2024-04-26 PROCEDURE — 99213 OFFICE O/P EST LOW 20 MIN: CPT | Performed by: NURSE PRACTITIONER

## 2024-04-26 PROCEDURE — 3078F DIAST BP <80 MM HG: CPT | Performed by: NURSE PRACTITIONER

## 2024-04-26 PROCEDURE — 3074F SYST BP LT 130 MM HG: CPT | Performed by: NURSE PRACTITIONER

## 2024-04-26 ASSESSMENT — FIBROSIS 4 INDEX: FIB4 SCORE: 0.87

## 2024-04-26 NOTE — LETTER
April 26, 2024         Patient: Lina Mojica   YOB: 1985   Date of Visit: 4/26/2024           To Whom it May Concern:    Lina Mojica was seen in my clinic on 4/26/2024.     If you have any questions or concerns, please don't hesitate to call.        Sincerely,           SREEKANTH [place]  Electronically Signed

## 2024-04-27 ASSESSMENT — ENCOUNTER SYMPTOMS
CHILLS: 0
LEG SWELLING: 1
VOMITING: 0
NAUSEA: 0
MYALGIAS: 0
FEVER: 0

## 2024-04-27 NOTE — PROGRESS NOTES
"Subjective:   Lina Mojica is a 38 y.o. female who presents for Leg Swelling ([id number] left lower leg/knee/calf swelling/painful/)      Leg Swelling  This is a new problem. Episode onset: 3 weeks; patient was sent from PCP for further evaluation. The problem occurs constantly. The problem has been gradually worsening. Pertinent negatives include no chills, fever, myalgias, nausea, rash or vomiting. Associated symptoms comments: Left lower leg swelling, calf pain. The symptoms are aggravated by walking. She has tried nothing for the symptoms.       Review of Systems   Constitutional:  Negative for chills and fever.   Gastrointestinal:  Negative for nausea and vomiting.   Musculoskeletal:  Negative for joint pain and myalgias.        Left lower leg swelling, calf pain   Skin:  Negative for rash.       Medications:    lamoTRIgine Tabs  lurasidone Tabs  traZODone Tabs    Allergies: Patient has no known allergies.    Problem List: Lina Mojica does not have a problem list on file.    Surgical History:  No past surgical history on file.    Past Social Hx: Lina Mojica  reports that she has quit smoking. Her smoking use included cigarettes. She has quit using smokeless tobacco. She reports that she does not currently use alcohol. She reports that she does not currently use drugs after having used the following drugs: Methamphetamines.     Past Family Hx:  Lina Mojica family history is not on file.     Problem list, medications, and allergies reviewed by myself today in Epic.     Objective:     /76   Pulse 91   Temp 37.3 °C (99.2 °F) (Temporal)   Resp 16   Ht 1.702 m (5' 7\")   Wt 81.6 kg (179 lb 14.4 oz)   SpO2 93%   BMI 28.18 kg/m²     Physical Exam  Constitutional:       Appearance: Normal appearance. She is not ill-appearing or toxic-appearing.   HENT:      Head: Normocephalic.      Right Ear: External ear normal.      Left Ear: External ear normal.      Nose: Nose normal.      " Mouth/Throat:      Lips: Pink.   Eyes:      General: Lids are normal.   Pulmonary:      Effort: Pulmonary effort is normal. No accessory muscle usage.   Musculoskeletal:      Cervical back: Full passive range of motion without pain.      Left lower leg: Swelling and tenderness present. No deformity, lacerations or bony tenderness. No edema.      Comments: Homans' sign positive no erythema   Skin:     Findings: Abrasion present.             Comments: Diffuse superficial abrasions anterior left lower leg   Neurological:      Mental Status: She is alert and oriented to person, place, and time.   Psychiatric:         Mood and Affect: Mood normal.         Thought Content: Thought content normal.         Assessment/Plan:     Diagnosis and associated orders:     1. Leg swelling  US-EXTREMITY VENOUS LOWER UNILAT LEFT         Comments/MDM:     I personally reviewed prior external notes and prior test results pertinent to today's visit.  Patient with left lower leg swelling and calf pain will obtain diagnostic ultrasound to exclude DVT does have superficial abrasions on the anterior aspect do not appear to be infected.  Will follow with results treat as indicated  Discussed management options, risks and benefits, and alternatives to treatment plan agreed upon.   Red flags discussed and indications to immediately call 911 or present to the Emergency Department.   Supportive care, differential diagnoses, and indications for immediate follow-up discussed with patient.    Patient expresses understanding and agrees to plan. Patient denies any other questions or concerns.              Please note that this dictation was created using voice recognition software. I have made a reasonable attempt to correct obvious errors, but I expect that there are errors of grammar and possibly content that I did not discover before finalizing the note.    This note was electronically signed by Blaine LAMBERT

## 2024-04-30 ENCOUNTER — HOSPITAL ENCOUNTER (OUTPATIENT)
Dept: RADIOLOGY | Facility: MEDICAL CENTER | Age: 39
End: 2024-04-30
Attending: NURSE PRACTITIONER
Payer: MEDICAID

## 2024-04-30 DIAGNOSIS — M79.89 LEG SWELLING: ICD-10-CM

## 2024-04-30 PROCEDURE — 93971 EXTREMITY STUDY: CPT | Mod: LT

## 2024-05-24 ENCOUNTER — HOSPITAL ENCOUNTER (OUTPATIENT)
Dept: RADIOLOGY | Facility: MEDICAL CENTER | Age: 39
End: 2024-05-24
Payer: MEDICAID

## 2024-05-24 DIAGNOSIS — R74.01 ELEVATION OF LEVELS OF LIVER TRANSAMINASE LEVELS: ICD-10-CM

## 2024-05-24 DIAGNOSIS — Z86.79 PERSONAL HISTORY OF OTHER DISEASES OF THE CIRCULATORY SYSTEM: ICD-10-CM

## 2024-06-18 ENCOUNTER — HOSPITAL ENCOUNTER (OUTPATIENT)
Dept: LAB | Facility: MEDICAL CENTER | Age: 39
End: 2024-06-18
Attending: NURSE PRACTITIONER
Payer: MEDICAID

## 2024-06-18 ENCOUNTER — OFFICE VISIT (OUTPATIENT)
Dept: URGENT CARE | Facility: CLINIC | Age: 39
End: 2024-06-18
Payer: MEDICAID

## 2024-06-18 VITALS
HEIGHT: 67 IN | HEART RATE: 61 BPM | OXYGEN SATURATION: 98 % | DIASTOLIC BLOOD PRESSURE: 62 MMHG | SYSTOLIC BLOOD PRESSURE: 90 MMHG | WEIGHT: 170 LBS | TEMPERATURE: 97.6 F | BODY MASS INDEX: 26.68 KG/M2 | RESPIRATION RATE: 12 BRPM

## 2024-06-18 DIAGNOSIS — R32 URINARY INCONTINENCE, UNSPECIFIED TYPE: ICD-10-CM

## 2024-06-18 LAB
ALBUMIN SERPL BCP-MCNC: 3.3 G/DL (ref 3.2–4.9)
ALBUMIN/GLOB SERPL: 1.1 G/DL
ALP SERPL-CCNC: 70 U/L (ref 30–99)
ALT SERPL-CCNC: 8 U/L (ref 2–50)
ANION GAP SERPL CALC-SCNC: 12 MMOL/L (ref 7–16)
APPEARANCE UR: NORMAL
AST SERPL-CCNC: 17 U/L (ref 12–45)
BILIRUB SERPL-MCNC: 0.4 MG/DL (ref 0.1–1.5)
BILIRUB UR STRIP-MCNC: NORMAL MG/DL
BUN SERPL-MCNC: 11 MG/DL (ref 8–22)
CALCIUM ALBUM COR SERPL-MCNC: 9.4 MG/DL (ref 8.5–10.5)
CALCIUM SERPL-MCNC: 8.8 MG/DL (ref 8.5–10.5)
CHLORIDE SERPL-SCNC: 103 MMOL/L (ref 96–112)
CHOLEST SERPL-MCNC: 115 MG/DL (ref 100–199)
CO2 SERPL-SCNC: 17 MMOL/L (ref 20–33)
COLOR UR AUTO: NORMAL
CREAT SERPL-MCNC: 0.92 MG/DL (ref 0.5–1.4)
CRP SERPL HS-MCNC: 0.5 MG/DL (ref 0–0.75)
ERYTHROCYTE [DISTWIDTH] IN BLOOD BY AUTOMATED COUNT: 44 FL (ref 35.9–50)
FASTING STATUS PATIENT QL REPORTED: NORMAL
GFR SERPLBLD CREATININE-BSD FMLA CKD-EPI: 82 ML/MIN/1.73 M 2
GLOBULIN SER CALC-MCNC: 2.9 G/DL (ref 1.9–3.5)
GLUCOSE SERPL-MCNC: 85 MG/DL (ref 65–99)
GLUCOSE UR STRIP.AUTO-MCNC: NORMAL MG/DL
HCT VFR BLD AUTO: 43.8 % (ref 37–47)
HDLC SERPL-MCNC: 32 MG/DL
HGB BLD-MCNC: 13.6 G/DL (ref 12–16)
KETONES UR STRIP.AUTO-MCNC: NORMAL MG/DL
LDLC SERPL CALC-MCNC: 66 MG/DL
LEUKOCYTE ESTERASE UR QL STRIP.AUTO: NORMAL
MCH RBC QN AUTO: 26.7 PG (ref 27–33)
MCHC RBC AUTO-ENTMCNC: 31.1 G/DL (ref 32.2–35.5)
MCV RBC AUTO: 85.9 FL (ref 81.4–97.8)
NITRITE UR QL STRIP.AUTO: NORMAL
PH UR STRIP.AUTO: 5.5 [PH] (ref 5–8)
PLATELET # BLD AUTO: 137 K/UL (ref 164–446)
PMV BLD AUTO: 9.9 FL (ref 9–12.9)
POCT INT CON NEG: NEGATIVE
POCT INT CON POS: POSITIVE
POCT URINE PREGNANCY TEST: NEGATIVE
POTASSIUM SERPL-SCNC: 4.6 MMOL/L (ref 3.6–5.5)
PROT SERPL-MCNC: 6.2 G/DL (ref 6–8.2)
PROT UR QL STRIP: NORMAL MG/DL
RBC # BLD AUTO: 5.1 M/UL (ref 4.2–5.4)
RBC UR QL AUTO: NORMAL
SODIUM SERPL-SCNC: 132 MMOL/L (ref 135–145)
SP GR UR STRIP.AUTO: 1.02
TRIGL SERPL-MCNC: 83 MG/DL (ref 0–149)
TSH SERPL DL<=0.005 MIU/L-ACNC: 1.43 UIU/ML (ref 0.38–5.33)
UROBILINOGEN UR STRIP-MCNC: 2 MG/DL
WBC # BLD AUTO: 4.4 K/UL (ref 4.8–10.8)

## 2024-06-18 PROCEDURE — 85027 COMPLETE CBC AUTOMATED: CPT

## 2024-06-18 PROCEDURE — 86140 C-REACTIVE PROTEIN: CPT

## 2024-06-18 PROCEDURE — 3078F DIAST BP <80 MM HG: CPT

## 2024-06-18 PROCEDURE — 84443 ASSAY THYROID STIM HORMONE: CPT

## 2024-06-18 PROCEDURE — 81025 URINE PREGNANCY TEST: CPT

## 2024-06-18 PROCEDURE — 81002 URINALYSIS NONAUTO W/O SCOPE: CPT

## 2024-06-18 PROCEDURE — 80061 LIPID PANEL: CPT

## 2024-06-18 PROCEDURE — 80053 COMPREHEN METABOLIC PANEL: CPT

## 2024-06-18 PROCEDURE — 36415 COLL VENOUS BLD VENIPUNCTURE: CPT

## 2024-06-18 PROCEDURE — 3074F SYST BP LT 130 MM HG: CPT

## 2024-06-18 PROCEDURE — 99214 OFFICE O/P EST MOD 30 MIN: CPT

## 2024-06-18 RX ORDER — CELECOXIB 200 MG/1
CAPSULE ORAL
COMMUNITY
Start: 2024-05-13

## 2024-06-18 RX ORDER — GABAPENTIN 300 MG/1
CAPSULE ORAL
COMMUNITY
Start: 2024-04-29

## 2024-06-18 RX ORDER — BUPROPION HYDROCHLORIDE 300 MG/1
TABLET ORAL
COMMUNITY
Start: 2024-04-30

## 2024-06-18 RX ORDER — ESCITALOPRAM OXALATE 10 MG/1
TABLET ORAL
COMMUNITY
Start: 2024-05-21

## 2024-06-18 RX ORDER — BUPRENORPHINE AND NALOXONE 8; 2 MG/1; MG/1
FILM, SOLUBLE BUCCAL; SUBLINGUAL
COMMUNITY
Start: 2024-05-28

## 2024-06-18 RX ORDER — LEVOTHYROXINE SODIUM 0.03 MG/1
TABLET ORAL
COMMUNITY
Start: 2024-05-21

## 2024-06-18 RX ORDER — BUPRENORPHINE 2 MG/1
4 TABLET SUBLINGUAL
COMMUNITY

## 2024-06-18 RX ORDER — DICYCLOMINE HCL 20 MG
20 TABLET ORAL
COMMUNITY
Start: 2024-02-06

## 2024-06-18 RX ORDER — CEFDINIR 300 MG/1
CAPSULE ORAL
COMMUNITY
Start: 2024-04-08

## 2024-06-18 RX ORDER — BUPROPION HYDROCHLORIDE 150 MG/1
TABLET ORAL
COMMUNITY
Start: 2024-06-18

## 2024-06-18 RX ORDER — CHOLECALCIFEROL (VITAMIN D3) 50 MCG
CAPSULE ORAL
COMMUNITY
Start: 2024-06-18

## 2024-06-18 ASSESSMENT — ENCOUNTER SYMPTOMS: FLANK PAIN: 0

## 2024-06-18 ASSESSMENT — FIBROSIS 4 INDEX: FIB4 SCORE: 1.53

## 2024-06-19 NOTE — PROGRESS NOTES
Subjective:   Lina Mojica is a 39 y.o. female who presents for Other (X1 month going on wakes up soaked in urine happens x5 time a week and unable to control urine. )      HPI: This is a 39-year-old female who presents today for urinary incontinence.  This is a new problem.  The patient reports that she has had frequent urinary incontinence over the last month.  She reports that her symptoms are worsened with change of position from sitting to standing.  She denies any pain with urination or urinary frequency.  She denies any new medications.  She denies any underlying urinary conditions.      Review of Systems   Genitourinary:  Negative for dysuria, flank pain, frequency, hematuria and urgency.        + Urinary incontinence       Medications:    Current Outpatient Medications on File Prior to Visit   Medication Sig Dispense Refill    buprenorphine (SUBUTEX) 2 MG SL Tab Place 4 mg under the tongue.      escitalopram (LEXAPRO) 10 MG Tab       buPROPion (WELLBUTRIN XL) 150 MG XL tablet       buPROPion (WELLBUTRIN XL) 300 MG XL tablet       cefdinir (OMNICEF) 300 MG Cap       celecoxib (CELEBREX) 200 MG Cap       Cholecalciferol (D3 SUPER STRENGTH) 50 MCG (2000 UT) Cap       diclofenac sodium (VOLTAREN) 1 % Gel       dicyclomine (BENTYL) 20 MG Tab Take 20 mg by mouth.      gabapentin (NEURONTIN) 300 MG Cap       levothyroxine (SYNTHROID) 25 MCG Tab       Buprenorphine HCl-Naloxone HCl 8-2 MG FILM       lurasidone (LATUDA) 40 MG Tab Take 40 mg by mouth with dinner.      lamoTRIgine (LAMICTAL) 25 MG Tab Take 25 mg by mouth every day.      traZODone (DESYREL) 50 MG Tab Take 50 mg by mouth every evening.       No current facility-administered medications on file prior to visit.        Allergies:   Patient has no known allergies.    Problem List:   There is no problem list on file for this patient.       Surgical History:  No past surgical history on file.    Past Social Hx:   Social History     Tobacco Use     "Smoking status: Former     Types: Cigarettes    Smokeless tobacco: Former   Vaping Use    Vaping status: Former   Substance Use Topics    Alcohol use: Not Currently    Drug use: Not Currently     Types: Methamphetamines     Comment: heroine          Problem list, medications, and allergies reviewed by myself today in Epic.     Objective:     BP 90/62   Pulse 61   Temp 36.4 °C (97.6 °F) (Temporal)   Resp 12   Ht 1.702 m (5' 7\")   Wt 77.1 kg (170 lb)   SpO2 98%   BMI 26.63 kg/m²     Physical Exam  Vitals and nursing note reviewed.   Constitutional:       General: She is not in acute distress.     Appearance: Normal appearance. She is normal weight. She is not ill-appearing, toxic-appearing or diaphoretic.   HENT:      Head: Normocephalic and atraumatic.   Cardiovascular:      Rate and Rhythm: Normal rate and regular rhythm.      Pulses: Normal pulses.      Heart sounds: Normal heart sounds. No murmur heard.     No friction rub. No gallop.   Pulmonary:      Effort: Pulmonary effort is normal. No respiratory distress.      Breath sounds: Normal breath sounds. No stridor. No wheezing, rhonchi or rales.   Chest:      Chest wall: No tenderness.   Abdominal:      Tenderness: There is no right CVA tenderness or left CVA tenderness.   Musculoskeletal:      Cervical back: Neck supple. No tenderness.   Lymphadenopathy:      Cervical: No cervical adenopathy.   Skin:     General: Skin is warm and dry.      Capillary Refill: Capillary refill takes less than 2 seconds.   Neurological:      General: No focal deficit present.      Mental Status: She is alert and oriented to person, place, and time. Mental status is at baseline.      Cranial Nerves: No cranial nerve deficit.      Motor: No weakness.      Gait: Gait normal.   Psychiatric:         Mood and Affect: Mood normal.         Behavior: Behavior normal.         Thought Content: Thought content normal.         Judgment: Judgment normal.         Assessment/Plan: "     Diagnosis and associated orders:   1. Urinary incontinence, unspecified type  POCT Urinalysis    POCT PREGNANCY    Referral to Urology          Comments/MDM:   Pt is clinically stable at today's acute urgent care visit.  No acute distress noted. Appropriate for outpatient management at this time.     Acute problem.  Patient presents today for urinary incontinence.  She does not have any symptoms of infection.  POC UA is unremarkable.  The patient will be referred to urology for further evaluation and management.  The patient is agreeable to plan of care.          Discussed DDx, management options (risks,benefits, and alternatives to planned treatment), natural progression and supportive care.  Expressed understanding and the treatment plan was agreed upon. Questions were encouraged and answered   Return to urgent care prn if new or worsening sx or if there is no improvement in condition prn.    Educated in Red flags and indications to immediately call 911 or present to the Emergency Department.   Advised the patient to follow-up with the primary care physician for recheck, reevaluation, and consideration of further management.    I personally reviewed prior external notes and test results pertinent to today's visit.  I have independently reviewed and interpreted all diagnostics ordered during this urgent care acute visit.       Please note that this dictation was created using voice recognition software. I have made a reasonable attempt to correct obvious errors, but I expect that there are errors of grammar and possibly content that I did not discover before finalizing the note.    This note was electronically signed by GIL Mccormick

## 2024-10-09 ENCOUNTER — OFFICE VISIT (OUTPATIENT)
Dept: URGENT CARE | Facility: CLINIC | Age: 39
End: 2024-10-09
Payer: MEDICAID

## 2024-10-09 VITALS
TEMPERATURE: 97.8 F | HEART RATE: 86 BPM | SYSTOLIC BLOOD PRESSURE: 112 MMHG | HEIGHT: 67 IN | DIASTOLIC BLOOD PRESSURE: 68 MMHG | WEIGHT: 149 LBS | OXYGEN SATURATION: 95 % | BODY MASS INDEX: 23.39 KG/M2 | RESPIRATION RATE: 14 BRPM

## 2024-10-09 DIAGNOSIS — N91.2 AMENORRHEA: ICD-10-CM

## 2024-10-09 DIAGNOSIS — K04.7 DENTAL ABSCESS: ICD-10-CM

## 2024-10-09 LAB
POCT INT CON NEG: NEGATIVE
POCT INT CON POS: POSITIVE
POCT URINE PREGNANCY TEST: NEGATIVE

## 2024-10-09 PROCEDURE — 3078F DIAST BP <80 MM HG: CPT | Performed by: PHYSICIAN ASSISTANT

## 2024-10-09 PROCEDURE — 99213 OFFICE O/P EST LOW 20 MIN: CPT | Performed by: PHYSICIAN ASSISTANT

## 2024-10-09 PROCEDURE — 3074F SYST BP LT 130 MM HG: CPT | Performed by: PHYSICIAN ASSISTANT

## 2024-10-09 PROCEDURE — 81025 URINE PREGNANCY TEST: CPT | Performed by: PHYSICIAN ASSISTANT

## 2024-10-09 RX ORDER — AMOXICILLIN 500 MG/1
500 CAPSULE ORAL 3 TIMES DAILY
Qty: 21 CAPSULE | Refills: 0 | Status: SHIPPED | OUTPATIENT
Start: 2024-10-09 | End: 2024-10-16

## 2024-10-09 RX ORDER — QUETIAPINE FUMARATE 50 MG/1
TABLET, FILM COATED ORAL
COMMUNITY
Start: 2024-10-08

## 2024-10-09 ASSESSMENT — ENCOUNTER SYMPTOMS
NAUSEA: 0
VOMITING: 0
CHILLS: 0
HEADACHES: 1
FEVER: 0

## 2024-10-09 ASSESSMENT — FIBROSIS 4 INDEX: FIB4 SCORE: 1.62

## 2024-10-17 ENCOUNTER — OFFICE VISIT (OUTPATIENT)
Dept: URGENT CARE | Facility: CLINIC | Age: 39
End: 2024-10-17
Payer: MEDICAID

## 2024-10-17 ENCOUNTER — APPOINTMENT (OUTPATIENT)
Dept: RADIOLOGY | Facility: MEDICAL CENTER | Age: 39
End: 2024-10-17
Attending: STUDENT IN AN ORGANIZED HEALTH CARE EDUCATION/TRAINING PROGRAM
Payer: MEDICAID

## 2024-10-17 ENCOUNTER — APPOINTMENT (OUTPATIENT)
Dept: RADIOLOGY | Facility: MEDICAL CENTER | Age: 39
End: 2024-10-17
Payer: MEDICAID

## 2024-10-17 ENCOUNTER — HOSPITAL ENCOUNTER (EMERGENCY)
Facility: MEDICAL CENTER | Age: 39
End: 2024-10-17
Attending: STUDENT IN AN ORGANIZED HEALTH CARE EDUCATION/TRAINING PROGRAM
Payer: MEDICAID

## 2024-10-17 VITALS
DIASTOLIC BLOOD PRESSURE: 77 MMHG | RESPIRATION RATE: 15 BRPM | HEART RATE: 72 BPM | BODY MASS INDEX: 23.88 KG/M2 | WEIGHT: 152.12 LBS | SYSTOLIC BLOOD PRESSURE: 116 MMHG | HEIGHT: 67 IN | OXYGEN SATURATION: 94 % | TEMPERATURE: 97.5 F

## 2024-10-17 VITALS
DIASTOLIC BLOOD PRESSURE: 82 MMHG | WEIGHT: 153 LBS | BODY MASS INDEX: 24.01 KG/M2 | HEIGHT: 67 IN | TEMPERATURE: 98.2 F | OXYGEN SATURATION: 99 % | SYSTOLIC BLOOD PRESSURE: 112 MMHG | HEART RATE: 85 BPM | RESPIRATION RATE: 14 BRPM

## 2024-10-17 DIAGNOSIS — R55 NEAR SYNCOPE: ICD-10-CM

## 2024-10-17 DIAGNOSIS — R51.9 NONINTRACTABLE HEADACHE, UNSPECIFIED CHRONICITY PATTERN, UNSPECIFIED HEADACHE TYPE: ICD-10-CM

## 2024-10-17 DIAGNOSIS — R11.0 NAUSEA: ICD-10-CM

## 2024-10-17 DIAGNOSIS — R42 DIZZINESS: ICD-10-CM

## 2024-10-17 DIAGNOSIS — R55 SYNCOPE, UNSPECIFIED SYNCOPE TYPE: ICD-10-CM

## 2024-10-17 LAB
ALBUMIN SERPL BCP-MCNC: 3.9 G/DL (ref 3.2–4.9)
ALBUMIN/GLOB SERPL: 1.4 G/DL
ALP SERPL-CCNC: 107 U/L (ref 30–99)
ALT SERPL-CCNC: 18 U/L (ref 2–50)
ANION GAP SERPL CALC-SCNC: 13 MMOL/L (ref 7–16)
APPEARANCE UR: CLEAR
APTT PPP: 29.8 SEC (ref 24.7–36)
AST SERPL-CCNC: 24 U/L (ref 12–45)
BASOPHILS # BLD AUTO: 1.1 % (ref 0–1.8)
BASOPHILS # BLD: 0.06 K/UL (ref 0–0.12)
BILIRUB SERPL-MCNC: 0.3 MG/DL (ref 0.1–1.5)
BILIRUB UR STRIP-MCNC: NEGATIVE MG/DL
BUN SERPL-MCNC: 9 MG/DL (ref 8–22)
CALCIUM ALBUM COR SERPL-MCNC: 9.4 MG/DL (ref 8.5–10.5)
CALCIUM SERPL-MCNC: 9.3 MG/DL (ref 8.5–10.5)
CHLORIDE SERPL-SCNC: 105 MMOL/L (ref 96–112)
CO2 SERPL-SCNC: 24 MMOL/L (ref 20–33)
COLOR UR AUTO: YELLOW
CREAT SERPL-MCNC: 0.66 MG/DL (ref 0.5–1.4)
EOSINOPHIL # BLD AUTO: 0.29 K/UL (ref 0–0.51)
EOSINOPHIL NFR BLD: 5.4 % (ref 0–6.9)
ERYTHROCYTE [DISTWIDTH] IN BLOOD BY AUTOMATED COUNT: 40.2 FL (ref 35.9–50)
GFR SERPLBLD CREATININE-BSD FMLA CKD-EPI: 116 ML/MIN/1.73 M 2
GLOBULIN SER CALC-MCNC: 2.8 G/DL (ref 1.9–3.5)
GLUCOSE BLD STRIP.AUTO-MCNC: 93 MG/DL (ref 65–99)
GLUCOSE BLD-MCNC: 74 MG/DL (ref 65–99)
GLUCOSE SERPL-MCNC: 80 MG/DL (ref 65–99)
GLUCOSE UR STRIP.AUTO-MCNC: NEGATIVE MG/DL
HCT VFR BLD AUTO: 42.8 % (ref 37–47)
HGB BLD-MCNC: 14.6 G/DL (ref 12–16)
IMM GRANULOCYTES # BLD AUTO: 0.02 K/UL (ref 0–0.11)
IMM GRANULOCYTES NFR BLD AUTO: 0.4 % (ref 0–0.9)
INR PPP: 1.03 (ref 0.87–1.13)
KETONES UR STRIP.AUTO-MCNC: NEGATIVE MG/DL
LEUKOCYTE ESTERASE UR QL STRIP.AUTO: NORMAL
LYMPHOCYTES # BLD AUTO: 1.82 K/UL (ref 1–4.8)
LYMPHOCYTES NFR BLD: 34 % (ref 22–41)
MCH RBC QN AUTO: 28.6 PG (ref 27–33)
MCHC RBC AUTO-ENTMCNC: 34.1 G/DL (ref 32.2–35.5)
MCV RBC AUTO: 83.8 FL (ref 81.4–97.8)
MONOCYTES # BLD AUTO: 0.45 K/UL (ref 0–0.85)
MONOCYTES NFR BLD AUTO: 8.4 % (ref 0–13.4)
NEUTROPHILS # BLD AUTO: 2.72 K/UL (ref 1.82–7.42)
NEUTROPHILS NFR BLD: 50.7 % (ref 44–72)
NITRITE UR QL STRIP.AUTO: NEGATIVE
NRBC # BLD AUTO: 0 K/UL
NRBC BLD-RTO: 0 /100 WBC (ref 0–0.2)
PH UR STRIP.AUTO: 7 [PH] (ref 5–8)
PLATELET # BLD AUTO: 213 K/UL (ref 164–446)
PMV BLD AUTO: 8.9 FL (ref 9–12.9)
POCT INT CON NEG: NEGATIVE
POCT INT CON POS: POSITIVE
POCT URINE PREGNANCY TEST: NEGATIVE
POTASSIUM SERPL-SCNC: 4.2 MMOL/L (ref 3.6–5.5)
PROT SERPL-MCNC: 6.7 G/DL (ref 6–8.2)
PROT UR QL STRIP: NEGATIVE MG/DL
PROTHROMBIN TIME: 13.5 SEC (ref 12–14.6)
RBC # BLD AUTO: 5.11 M/UL (ref 4.2–5.4)
RBC UR QL AUTO: NEGATIVE
SODIUM SERPL-SCNC: 142 MMOL/L (ref 135–145)
SP GR UR STRIP.AUTO: 1.01
TROPONIN T SERPL-MCNC: 6 NG/L (ref 6–19)
UROBILINOGEN UR STRIP-MCNC: NORMAL MG/DL
WBC # BLD AUTO: 5.4 K/UL (ref 4.8–10.8)

## 2024-10-17 PROCEDURE — 80053 COMPREHEN METABOLIC PANEL: CPT

## 2024-10-17 PROCEDURE — 99284 EMERGENCY DEPT VISIT MOD MDM: CPT

## 2024-10-17 PROCEDURE — 99215 OFFICE O/P EST HI 40 MIN: CPT | Performed by: PHYSICIAN ASSISTANT

## 2024-10-17 PROCEDURE — 85025 COMPLETE CBC W/AUTO DIFF WBC: CPT

## 2024-10-17 PROCEDURE — 70450 CT HEAD/BRAIN W/O DYE: CPT

## 2024-10-17 PROCEDURE — 36415 COLL VENOUS BLD VENIPUNCTURE: CPT

## 2024-10-17 PROCEDURE — 84484 ASSAY OF TROPONIN QUANT: CPT

## 2024-10-17 PROCEDURE — 82962 GLUCOSE BLOOD TEST: CPT

## 2024-10-17 PROCEDURE — 81002 URINALYSIS NONAUTO W/O SCOPE: CPT | Performed by: PHYSICIAN ASSISTANT

## 2024-10-17 PROCEDURE — 85610 PROTHROMBIN TIME: CPT

## 2024-10-17 PROCEDURE — 82962 GLUCOSE BLOOD TEST: CPT | Performed by: PHYSICIAN ASSISTANT

## 2024-10-17 PROCEDURE — 93000 ELECTROCARDIOGRAM COMPLETE: CPT | Performed by: PHYSICIAN ASSISTANT

## 2024-10-17 PROCEDURE — 85730 THROMBOPLASTIN TIME PARTIAL: CPT

## 2024-10-17 PROCEDURE — 93005 ELECTROCARDIOGRAM TRACING: CPT | Performed by: STUDENT IN AN ORGANIZED HEALTH CARE EDUCATION/TRAINING PROGRAM

## 2024-10-17 PROCEDURE — 93005 ELECTROCARDIOGRAM TRACING: CPT

## 2024-10-17 PROCEDURE — 71045 X-RAY EXAM CHEST 1 VIEW: CPT

## 2024-10-17 PROCEDURE — 81025 URINE PREGNANCY TEST: CPT | Performed by: PHYSICIAN ASSISTANT

## 2024-10-17 PROCEDURE — 3074F SYST BP LT 130 MM HG: CPT | Performed by: PHYSICIAN ASSISTANT

## 2024-10-17 PROCEDURE — 3079F DIAST BP 80-89 MM HG: CPT | Performed by: PHYSICIAN ASSISTANT

## 2024-10-17 ASSESSMENT — ENCOUNTER SYMPTOMS
COUGH: 0
SINUS PAIN: 0
VOMITING: 0
FLANK PAIN: 0
DOUBLE VISION: 0
MYALGIAS: 0
DIAPHORESIS: 0
HEADACHES: 1
TINGLING: 0
SORE THROAT: 0
ABDOMINAL PAIN: 0
FEVER: 0
WEIGHT LOSS: 0
BLURRED VISION: 0
WEAKNESS: 0
DIARRHEA: 0
PALPITATIONS: 0
CHILLS: 0
BLOOD IN STOOL: 1
DIZZINESS: 1
SHORTNESS OF BREATH: 0
WHEEZING: 0
NAUSEA: 1

## 2024-10-17 ASSESSMENT — FIBROSIS 4 INDEX
FIB4 SCORE: 1.62
FIB4 SCORE: 1.62

## 2024-10-17 ASSESSMENT — PAIN DESCRIPTION - PAIN TYPE: TYPE: ACUTE PAIN

## 2024-10-18 LAB — EKG IMPRESSION: NORMAL

## 2025-04-22 ENCOUNTER — TELEPHONE (OUTPATIENT)
Dept: OBGYN | Facility: CLINIC | Age: 40
End: 2025-04-22
Payer: MEDICAID